# Patient Record
Sex: MALE | URBAN - METROPOLITAN AREA
[De-identification: names, ages, dates, MRNs, and addresses within clinical notes are randomized per-mention and may not be internally consistent; named-entity substitution may affect disease eponyms.]

---

## 2020-07-25 ENCOUNTER — TELEPHONE ENCOUNTER (OUTPATIENT)
Dept: URBAN - METROPOLITAN AREA CLINIC 13 | Facility: CLINIC | Age: 60
End: 2020-07-25

## 2020-07-26 ENCOUNTER — TELEPHONE ENCOUNTER (OUTPATIENT)
Dept: URBAN - METROPOLITAN AREA CLINIC 13 | Facility: CLINIC | Age: 60
End: 2020-07-26

## 2021-06-04 ENCOUNTER — CLAIMS CREATED FROM THE CLAIM WINDOW (OUTPATIENT)
Dept: URBAN - METROPOLITAN AREA MEDICAL CENTER 2 | Facility: MEDICAL CENTER | Age: 61
End: 2021-06-04
Payer: COMMERCIAL

## 2021-06-04 DIAGNOSIS — D50.8 OTHER IRON DEFICIENCY ANEMIAS: ICD-10-CM

## 2021-06-04 DIAGNOSIS — R19.5 OTHER FECAL ABNORMALITIES: ICD-10-CM

## 2021-06-04 PROCEDURE — 99253 IP/OBS CNSLTJ NEW/EST LOW 45: CPT | Performed by: INTERNAL MEDICINE

## 2021-06-05 ENCOUNTER — CLAIMS CREATED FROM THE CLAIM WINDOW (OUTPATIENT)
Dept: URBAN - METROPOLITAN AREA MEDICAL CENTER 2 | Facility: MEDICAL CENTER | Age: 61
End: 2021-06-05
Payer: COMMERCIAL

## 2021-06-05 DIAGNOSIS — R19.5 OTHER FECAL ABNORMALITIES: ICD-10-CM

## 2021-06-05 DIAGNOSIS — D50.0 IRON DEFICIENCY ANEMIA SECONDARY TO BLOOD LOSS (CHRONIC): ICD-10-CM

## 2021-06-05 PROCEDURE — 43235 EGD DIAGNOSTIC BRUSH WASH: CPT | Performed by: INTERNAL MEDICINE

## 2021-06-06 ENCOUNTER — CLAIMS CREATED FROM THE CLAIM WINDOW (OUTPATIENT)
Dept: URBAN - METROPOLITAN AREA MEDICAL CENTER 2 | Facility: MEDICAL CENTER | Age: 61
End: 2021-06-06
Payer: COMMERCIAL

## 2021-06-06 DIAGNOSIS — R19.5 OTHER FECAL ABNORMALITIES: ICD-10-CM

## 2021-06-06 DIAGNOSIS — D50.0 IRON DEFICIENCY ANEMIA SECONDARY TO BLOOD LOSS (CHRONIC): ICD-10-CM

## 2021-06-06 PROCEDURE — 99232 SBSQ HOSP IP/OBS MODERATE 35: CPT | Performed by: INTERNAL MEDICINE

## 2022-06-02 ENCOUNTER — LAB OUTSIDE AN ENCOUNTER (OUTPATIENT)
Dept: URBAN - METROPOLITAN AREA CLINIC 113 | Facility: CLINIC | Age: 62
End: 2022-06-02

## 2022-06-02 ENCOUNTER — OFFICE VISIT (OUTPATIENT)
Dept: URBAN - METROPOLITAN AREA CLINIC 113 | Facility: CLINIC | Age: 62
End: 2022-06-02
Payer: COMMERCIAL

## 2022-06-02 VITALS
DIASTOLIC BLOOD PRESSURE: 101 MMHG | HEART RATE: 71 BPM | SYSTOLIC BLOOD PRESSURE: 160 MMHG | TEMPERATURE: 97.5 F | BODY MASS INDEX: 23.91 KG/M2 | HEIGHT: 70 IN | RESPIRATION RATE: 18 BRPM | WEIGHT: 167 LBS

## 2022-06-02 DIAGNOSIS — D64.9 CHRONIC ANEMIA: ICD-10-CM

## 2022-06-02 DIAGNOSIS — F10.10 ALCOHOL ABUSE: ICD-10-CM

## 2022-06-02 DIAGNOSIS — R18.8 OTHER ASCITES: ICD-10-CM

## 2022-06-02 PROBLEM — 15167005: Status: ACTIVE | Noted: 2022-06-02

## 2022-06-02 PROBLEM — 191268006: Status: ACTIVE | Noted: 2022-06-02

## 2022-06-02 PROCEDURE — 99214 OFFICE O/P EST MOD 30 MIN: CPT | Performed by: NURSE PRACTITIONER

## 2022-06-02 RX ORDER — CALCIUM ACETATE 667 MG/1
2 TABLETS WITH MEALS TABLET ORAL THREE TIMES A DAY
Status: ACTIVE | COMMUNITY

## 2022-06-02 RX ORDER — FUROSEMIDE 20 MG/1
1 TABLET TABLET ORAL ONCE A DAY
Status: ACTIVE | COMMUNITY

## 2022-06-02 RX ORDER — PANTOPRAZOLE SODIUM 40 MG/1
1 TABLET TABLET, DELAYED RELEASE ORAL ONCE A DAY
Status: ACTIVE | COMMUNITY

## 2022-06-02 RX ORDER — CARVEDILOL 3.12 MG/1
1 TABLET WITH FOOD TABLET, FILM COATED ORAL TWICE A DAY
Status: ACTIVE | COMMUNITY

## 2022-06-02 RX ORDER — POTASSIUM CHLORIDE 10 MEQ/1
1 TABLET WITH FOOD TABLET, FILM COATED, EXTENDED RELEASE ORAL TWICE A DAY
Status: ACTIVE | COMMUNITY

## 2022-06-02 NOTE — HPI-TODAY'S VISIT:
This is a 61-year-old male with a history of hypertension, diabetes, chronic kidney disease/nephrotic syndrome, GERD, and iron deficiency anemia presenting for evaluation of abdominal pain and swelling. He was initially seen in hospital consultation 6/4/2021 for anemia.  He had presented to the emergency department for leg swelling and was found to have a hemoglobin of 6.4.  He required transfusion.  Prior EGD and colonoscopy from May 2016 were reviewed (performed to evaluate iron deficiency anemia) revealing diverticulosis, nonbleeding internal hemorrhoids, and a normal EGD.  Labs during hospitalization demonstrated iron deficiency.  It was discussed anemia was likely associated with chronic disease.  He underwent an EGD 6/5/2021 that was normal.  He did not return for follow-up.  He reports swelling is in his abdomen for the last 6 months.  He has undergone a paracentesis on 3 occasions, twice at [place] and once at [facility].  The last one was performed at [facility] 1 month ago.  He has tightness in his abdomen associated with swelling.  He denies pain.  He denies heartburn, regurgitation, dysphagia, nausea, diarrhea, constipation, or any other abdominal symptoms.  He denies lower extremity edema.  Occasionally, he swells in his face and jaw.  He denies fever.  He intermittently requires transfusions for low hemoglobin. He remains under the care of of nephrology, Dr. Parmar.  He states she is of the opinion that his "kidneys are bad."  He is not sure whether or not he has liver disease.  He denies a family history of liver disease.  He has a history of daily alcohol use consuming 2-4 beers and a half a pint of gin per day.  He stopped drinking alcohol approximately 2 months ago.  He is currently residing in a nursing home for physical therapy and rehab.  He was hospitalized at [facility] 2 to 3 months ago for swelling.  Abdominal imaging has been performed at [place].  He has not been told that he has cirrhosis.  He has follow-up with Dr. Parmar next week. Colonoscopy 5/12/2016:Good prep, sigmoid and descending diverticulosis, nonbleeding internal hemorrhoids, otherwise normal.  Screening recommended in 2026.  EGD 5/12/2016: Normal esophagus, normal stomach, normal examined duodenum.

## 2022-06-02 NOTE — PHYSICAL EXAM GASTROINTESTINAL
Abdomen , soft, nontender, distended due to ascites; no guarding or rigidity , no masses palpable , normal bowel sounds , Liver and Spleen , no hepatosplenomegaly; exam limited due to patient position

## 2022-06-02 NOTE — HPI-OTHER HISTORIES
Labs 4/30/2022:BMP normal with exception of sodium 133, chloride 110, BUN 76, creatinine 3.51, calcium 7.3, protein 4.1, albumin 1.9.  LFTs: TB 0.1, ALP 60, ALT 16, AST 30.  CBC: WBC 7.3, hemoglobin 8.3, MCV 93.2, platelet 195.  PT 11.5, INR 1.02.  PTT 35.7.  Ammonia level less than 8.70. EGD 6/5/2021:  normal Colonoscopy 5/12/2016:Good prep, sigmoid and descending diverticulosis, nonbleeding internal hemorrhoids, otherwise normal.  Screening recommended in 2026.   EGD 5/12/2016: Normal esophagus, normal stomach, normal examined duodenum.

## 2022-06-06 ENCOUNTER — TELEPHONE ENCOUNTER (OUTPATIENT)
Dept: URBAN - METROPOLITAN AREA CLINIC 113 | Facility: CLINIC | Age: 62
End: 2022-06-06

## 2022-06-08 ENCOUNTER — TELEPHONE ENCOUNTER (OUTPATIENT)
Dept: URBAN - METROPOLITAN AREA CLINIC 113 | Facility: CLINIC | Age: 62
End: 2022-06-08

## 2022-06-13 ENCOUNTER — OFFICE VISIT (OUTPATIENT)
Dept: URBAN - METROPOLITAN AREA CLINIC 113 | Facility: CLINIC | Age: 62
End: 2022-06-13
Payer: COMMERCIAL

## 2022-06-13 VITALS
HEART RATE: 87 BPM | RESPIRATION RATE: 20 BRPM | WEIGHT: 179.6 LBS | HEIGHT: 70 IN | DIASTOLIC BLOOD PRESSURE: 87 MMHG | BODY MASS INDEX: 25.71 KG/M2 | TEMPERATURE: 97.1 F | SYSTOLIC BLOOD PRESSURE: 139 MMHG

## 2022-06-13 DIAGNOSIS — R18.8 OTHER ASCITES: ICD-10-CM

## 2022-06-13 PROCEDURE — 99213 OFFICE O/P EST LOW 20 MIN: CPT | Performed by: NURSE PRACTITIONER

## 2022-06-13 RX ORDER — POTASSIUM CHLORIDE 10 MEQ/1
1 TABLET WITH FOOD TABLET, FILM COATED, EXTENDED RELEASE ORAL TWICE A DAY
Status: ACTIVE | COMMUNITY

## 2022-06-13 RX ORDER — CARVEDILOL 3.12 MG/1
1 TABLET WITH FOOD TABLET, FILM COATED ORAL TWICE A DAY
Status: ACTIVE | COMMUNITY

## 2022-06-13 RX ORDER — CALCIUM ACETATE 667 MG/1
2 TABLETS WITH MEALS TABLET ORAL THREE TIMES A DAY
Status: ACTIVE | COMMUNITY

## 2022-06-13 RX ORDER — PANTOPRAZOLE SODIUM 40 MG/1
1 TABLET TABLET, DELAYED RELEASE ORAL ONCE A DAY
Status: ACTIVE | COMMUNITY

## 2022-06-13 RX ORDER — FUROSEMIDE 20 MG/1
1 TABLET TABLET ORAL ONCE A DAY
Status: ACTIVE | COMMUNITY

## 2022-06-13 NOTE — HPI-OTHER HISTORIES
Labs 4/30/2022:BMP normal with exception of sodium 133, chloride 110, BUN 76, creatinine 3.51, calcium 7.3, protein 4.1, albumin 1.9.  LFTs: TB 0.1, ALP 60, ALT 16, AST 30.  CBC: WBC 7.3, hemoglobin 8.3, MCV 93.2, platelet 195.  PT 11.5, INR 1.02.  PTT 35.7.  Ammonia level less than 8.70. CT of the abdomen and pelvis without contrast 4/29/2022:Marked abdominal/pelvic ascites.  Hepatomegaly.  No evidence of renal obstruction/calculi.  Appendix was identified and is normal.  Mild diverticulosis. EGD 6/5/2021:  normal Colonoscopy 5/12/2016:Good prep, sigmoid and descending diverticulosis, nonbleeding internal hemorrhoids, otherwise normal.  Screening recommended in 2026.   EGD 5/12/2016: Normal esophagus, normal stomach, normal examined duodenum.

## 2022-06-13 NOTE — PHYSICAL EXAM GASTROINTESTINAL
Abdomen , soft, nontender, distended due to ascites , no guarding or rigidity , no masses palpable , normal bowel sounds , Liver and Spleen , no hepatosplenomegaly

## 2022-06-13 NOTE — PHYSICAL EXAM CARDIOVASCULAR:
2-3+ BLE edema; pitting edema over abdomen and lumbar area, no murmurs,  regular rate and rhythm, split S2

## 2022-06-13 NOTE — HPI-TODAY'S VISIT:
This is a 61-year-old male with a history of hypertension, diabetes, chronic kidney disease stage IV/nephrotic syndrome, GERD, chronic anemia/iron deficiency, and alcohol abuse presenting for short interval follow-up. He was last seen 6/2/2022.  He had been seen in hospital consultation in June 2021 for anemia for which she had a normal EGD.  Prior evaluation in 2016 for iron deficiency anemia revealed diverticulosis, nonbleeding internal hemorrhoids, and EGD. He reported a 6-month history of abdominal swelling.  He had undergone paracentesis on 3 occasions.  He was under the care of Dr. Parmar for nephrology.  He was not sure whether or not he had liver disease and his sister was concerned this may be contributing.  He admitted to drinking alcohol on a daily basis until 2 months prior to his visit.  It was discussed that his ascites was likely associated with nephrotic syndrome.  Due to his alcohol abuse however, cirrhosis was a consideration.  Records were requested and he was to be scheduled for a repeat paracentesis with fluid analysis to assess AA gradient.  He was to continue close follow-up with nephrology and diuretics per Dr. Parmar.  He reported persistent anemia requiring intermittent infusions.  He denies signs of overt GI blood loss.  Anemia of chronic disease suspected. He had follow-up with Dr. Parmar 6/9/2022.  He reports his next visit with her is in early August.  He is scheduled for a paracentesis 6/15/2022.

## 2022-06-15 ENCOUNTER — TELEPHONE ENCOUNTER (OUTPATIENT)
Dept: URBAN - METROPOLITAN AREA CLINIC 113 | Facility: CLINIC | Age: 62
End: 2022-06-15

## 2022-06-20 ENCOUNTER — OFFICE VISIT (OUTPATIENT)
Dept: URBAN - METROPOLITAN AREA CLINIC 113 | Facility: CLINIC | Age: 62
End: 2022-06-20

## 2022-07-06 ENCOUNTER — OFFICE VISIT (OUTPATIENT)
Dept: URBAN - METROPOLITAN AREA CLINIC 113 | Facility: CLINIC | Age: 62
End: 2022-07-06
Payer: COMMERCIAL

## 2022-07-06 VITALS
DIASTOLIC BLOOD PRESSURE: 78 MMHG | SYSTOLIC BLOOD PRESSURE: 128 MMHG | HEART RATE: 82 BPM | WEIGHT: 171 LBS | HEIGHT: 70 IN | TEMPERATURE: 97.1 F | BODY MASS INDEX: 24.48 KG/M2 | RESPIRATION RATE: 18 BRPM

## 2022-07-06 DIAGNOSIS — R60.1 ANASARCA: ICD-10-CM

## 2022-07-06 DIAGNOSIS — R18.8 OTHER ASCITES: ICD-10-CM

## 2022-07-06 PROBLEM — 271808008: Status: ACTIVE | Noted: 2022-07-06

## 2022-07-06 PROCEDURE — 99213 OFFICE O/P EST LOW 20 MIN: CPT | Performed by: NURSE PRACTITIONER

## 2022-07-06 RX ORDER — FOLIC ACID/VIT B COMPLEX AND C 0.8 MG
1 TABLET TABLET ORAL ONCE A DAY
Status: ACTIVE | COMMUNITY

## 2022-07-06 RX ORDER — METOLAZONE 5 MG/1
1 TABLET TABLET ORAL ONCE A DAY
Status: ACTIVE | COMMUNITY

## 2022-07-06 RX ORDER — PANTOPRAZOLE SODIUM 40 MG/1
1 TABLET TABLET, DELAYED RELEASE ORAL ONCE A DAY
Status: ACTIVE | COMMUNITY

## 2022-07-06 RX ORDER — FUROSEMIDE 20 MG/1
1 TABLET TABLET ORAL ONCE A DAY
Status: ACTIVE | COMMUNITY

## 2022-07-06 RX ORDER — CARVEDILOL 3.12 MG/1
1 TABLET WITH FOOD TABLET, FILM COATED ORAL TWICE A DAY
Status: ACTIVE | COMMUNITY

## 2022-07-06 RX ORDER — POTASSIUM CHLORIDE 10 MEQ/1
1 TABLET WITH FOOD TABLET, FILM COATED, EXTENDED RELEASE ORAL TWICE A DAY
Status: ACTIVE | COMMUNITY

## 2022-07-06 RX ORDER — CALCIUM ACETATE 667 MG/1
2 TABLETS WITH MEALS TABLET ORAL THREE TIMES A DAY
Status: ACTIVE | COMMUNITY

## 2022-07-06 RX ORDER — CHOLECALCIFEROL (VITAMIN D3) 125 MCG
1 TABLET TABLET ORAL ONCE A DAY
Status: ACTIVE | COMMUNITY

## 2022-07-06 RX ORDER — SODIUM BICARBONATE TAB 650 MG 650 MG
1 TABLET TAB ORAL THREE TIMES A DAY
Status: ACTIVE | COMMUNITY

## 2022-07-06 RX ORDER — ALBUTEROL SULFATE 90 UG/1
1 PUFF AS NEEDED AEROSOL, METERED RESPIRATORY (INHALATION)
Status: ACTIVE | COMMUNITY

## 2022-07-06 RX ORDER — BUMETANIDE 2 MG/1
1 TABLET TABLET ORAL TWICE A DAY
Status: ACTIVE | COMMUNITY

## 2022-07-06 RX ORDER — ATORVASTATIN CALCIUM 40 MG/1
1 TABLET TABLET, FILM COATED ORAL ONCE A DAY
Status: ACTIVE | COMMUNITY

## 2022-07-06 NOTE — HPI-TODAY'S VISIT:
This is a 61-year-old male with a history of hypertension, diabetes, chronic kidney disease stage IV/nephrotic syndrome, GERD, chronic anemia/iron deficiency, and alcohol abuse presenting for follow-up.  He was last seen 6/13/2022 with ascites.  His case was discussed with Dr. Conner who agreed that ascites/anasarca was associated with nephrotic syndrome.  He had a history of alcohol abuse and stopped drinking 2 months prior.  At a prior visit, paracentesis was ordered with labs to assess AA gradient.  This was pending.  A CT without contrast in April showed hepatomegaly but cirrhosis was not mentioned.  His platelet count was normal supporting and unlikely diagnosis of cirrhosis.  It was discussed that, if he was diagnosed with cirrhosis, current management was not changed.  Due to renal function, volume overload would be managed by nephrology and, due to his comorbid conditions, he would not be a transplant candidate. His sister called the office 6/15/2022 reporting that he was very uncomfortable due to ascites.  He was scheduled for paracentesis the that day.  She was informed that ascites/volume status was related nephrotic syndrome and there was no evidence thus far that he had cirrhosis of the liver.  Labs were planned with his paracentesis.  She was also informed that if he had cirrhosis, due to his renal problems, nephrology would manage volume status and, due to his comorbid conditions, he would not be a candidate for liver transplant.  She reported that dialysis was planned if he did not improve.  Close follow-up with nephrology was encouraged. There are no records available regarding a June paracentesis. He continues to follow closely with Dr. Parmar.  He underwent paracentesis as scheduled.  He denies any abdominal symptoms.  He continues to report abdominal and leg swelling. He continues to abstain from ETOH.

## 2022-07-06 NOTE — HPI-OTHER HISTORIES
Labs  6/28/2022:CBC: WBC 6.2, hemoglobin 8, MCV 92.1, platelet 324.  BMP normal with exception of sodium 132, potassium 3.2, chloride 108, BUN 67, creatinine 2.22, calcium 6.9, protein 3.9, albumin 1.6.  LFTs: TB less than 0.1, ALP 52, ALT 17, AST 30.  Magnesium 2.0.  Phosphorus 4.27. 4/30/2022:BMP normal with exception of sodium 133, chloride 110, BUN 76, creatinine 3.51, calcium 7.3, protein 4.1, albumin 1.9.  LFTs: TB 0.1, ALP 60, ALT 16, AST 30.  CBC: WBC 7.3, hemoglobin 8.3, MCV 93.2, platelet 195.  PT 11.5, INR 1.02.  PTT 35.7.  Ammonia level less than 8.70. CT of the abdomen and pelvis without contrast 4/29/2022:Marked abdominal/pelvic ascites.  Hepatomegaly.  No evidence of renal obstruction/calculi.  Appendix was identified and is normal.  Mild diverticulosis. EGD 6/5/2021:  normal Colonoscopy 5/12/2016:Good prep, sigmoid and descending diverticulosis, nonbleeding internal hemorrhoids, otherwise normal.  Screening recommended in 2026.   EGD 5/12/2016: Normal esophagus, normal stomach, normal examined duodenum.

## 2022-08-08 ENCOUNTER — OFFICE VISIT (OUTPATIENT)
Dept: URBAN - METROPOLITAN AREA CLINIC 113 | Facility: CLINIC | Age: 62
End: 2022-08-08

## 2022-08-31 ENCOUNTER — OFFICE VISIT (OUTPATIENT)
Dept: URBAN - METROPOLITAN AREA CLINIC 113 | Facility: CLINIC | Age: 62
End: 2022-08-31
Payer: COMMERCIAL

## 2022-08-31 ENCOUNTER — DASHBOARD ENCOUNTERS (OUTPATIENT)
Age: 62
End: 2022-08-31

## 2022-08-31 VITALS
SYSTOLIC BLOOD PRESSURE: 171 MMHG | TEMPERATURE: 97.8 F | DIASTOLIC BLOOD PRESSURE: 101 MMHG | WEIGHT: 158 LBS | BODY MASS INDEX: 22.62 KG/M2 | HEART RATE: 73 BPM | HEIGHT: 70 IN

## 2022-08-31 DIAGNOSIS — E61.1 IRON DEFICIENCY: ICD-10-CM

## 2022-08-31 DIAGNOSIS — D64.9 NORMOCYTIC ANEMIA: ICD-10-CM

## 2022-08-31 DIAGNOSIS — R18.8 OTHER ASCITES: ICD-10-CM

## 2022-08-31 PROCEDURE — 99214 OFFICE O/P EST MOD 30 MIN: CPT | Performed by: NURSE PRACTITIONER

## 2022-08-31 RX ORDER — FOLIC ACID/VIT B COMPLEX AND C 0.8 MG
1 TABLET TABLET ORAL ONCE A DAY
Status: ACTIVE | COMMUNITY

## 2022-08-31 RX ORDER — ALBUTEROL SULFATE 90 UG/1
1 PUFF AS NEEDED AEROSOL, METERED RESPIRATORY (INHALATION)
Status: ACTIVE | COMMUNITY

## 2022-08-31 RX ORDER — FUROSEMIDE 20 MG/1
1 TABLET TABLET ORAL ONCE A DAY
Status: ACTIVE | COMMUNITY

## 2022-08-31 RX ORDER — PANTOPRAZOLE SODIUM 40 MG/1
1 TABLET TABLET, DELAYED RELEASE ORAL ONCE A DAY
Status: ACTIVE | COMMUNITY

## 2022-08-31 RX ORDER — BUMETANIDE 2 MG/1
1 TABLET TABLET ORAL TWICE A DAY
Status: ACTIVE | COMMUNITY

## 2022-08-31 RX ORDER — ATORVASTATIN CALCIUM 40 MG/1
1 TABLET TABLET, FILM COATED ORAL ONCE A DAY
Status: ACTIVE | COMMUNITY

## 2022-08-31 RX ORDER — CHOLECALCIFEROL (VITAMIN D3) 125 MCG
1 TABLET TABLET ORAL ONCE A DAY
Status: ACTIVE | COMMUNITY

## 2022-08-31 RX ORDER — CARVEDILOL 3.12 MG/1
1 TABLET WITH FOOD TABLET, FILM COATED ORAL TWICE A DAY
Status: ACTIVE | COMMUNITY

## 2022-08-31 RX ORDER — CALCIUM ACETATE 667 MG/1
2 TABLETS WITH MEALS TABLET ORAL THREE TIMES A DAY
Status: ACTIVE | COMMUNITY

## 2022-08-31 RX ORDER — POTASSIUM CHLORIDE 10 MEQ/1
1 TABLET WITH FOOD TABLET, FILM COATED, EXTENDED RELEASE ORAL TWICE A DAY
Status: ACTIVE | COMMUNITY

## 2022-08-31 RX ORDER — METOLAZONE 5 MG/1
1 TABLET TABLET ORAL ONCE A DAY
Status: ACTIVE | COMMUNITY

## 2022-08-31 RX ORDER — SODIUM BICARBONATE TAB 650 MG 650 MG
1 TABLET TAB ORAL THREE TIMES A DAY
Status: ACTIVE | COMMUNITY

## 2022-08-31 NOTE — HPI-TODAY'S VISIT:
This patient is a 61-year-old male with a history of hypertension, diabetes, chronic kidney disease stage IV/nephrotic syndrome, GERD, chronic anemia/iron deficiency, and alcohol abuse presenting for follow-up. He was last seen 7/6/2022.  It was determined that ascites/anasarca was associated with nephrotic syndrome.  There was no evidence of chronic liver disease.  He was to continue close follow-up with nephrology for renal and fluid management and undergo paracentesis per Dr. Parmar's recommendations.  He was to follow-up as needed. He reports that he has improved regarding abdominal swelling.  He has not required a paracentesis recently.  Swelling has overall improved. He reports that he was admitted to [facility] last month due to anemia and required blood transfusions.  He was  admitted for 24 hours at [facility] 2 weeks ago due to anemia requiring transfusion.  He has been referred to Dr. Mallory for a hematological evaluation and has an appointment with him 9/8/2022.  He is having 1-3 formed stools daily.  He denies red blood per rectum.  His stools are dark but not black.  He is on Claudia-Shira which she states contains iron but is on no other iron supplements.  He denies abdominal pain or any other abdominal symptoms.

## 2022-08-31 NOTE — PHYSICAL EXAM GASTROINTESTINAL
Abdomen , soft, nontender, distended due to ascites (improved compared to prior exams) , no guarding or rigidity , no masses palpable , normal bowel sounds , Liver and Spleen , no hepatosplenomegaly

## 2022-09-01 ENCOUNTER — TELEPHONE ENCOUNTER (OUTPATIENT)
Dept: URBAN - METROPOLITAN AREA CLINIC 113 | Facility: CLINIC | Age: 62
End: 2022-09-01

## 2022-09-01 PROBLEM — 35240004: Status: ACTIVE | Noted: 2022-09-01

## 2022-09-01 PROBLEM — 300980002: Status: ACTIVE | Noted: 2022-09-01

## 2022-09-01 PROBLEM — 389026000: Status: ACTIVE | Noted: 2022-06-02

## 2022-09-01 LAB
ABSOLUTE BASOPHILS: 48
ABSOLUTE EOSINOPHILS: 265
ABSOLUTE LYMPHOCYTES: 1006
ABSOLUTE MONOCYTES: 490
ABSOLUTE NEUTROPHILS: 4991
BASOPHILS: 0.7
EOSINOPHILS: 3.9
FERRITIN, SERUM: 1082
FOLATE (FOLIC ACID), SERUM: 15.8
HEMATOCRIT: 24.9
HEMOGLOBIN: 8.1
IRON BIND.CAP.(TIBC): 110
IRON SATURATION: 45
IRON: 49
LYMPHOCYTES: 14.8
MCH: 29.9
MCHC: 32.5
MCV: 91.9
MONOCYTES: 7.2
MPV: 10.5
NEUTROPHILS: 73.4
PLATELET COUNT: 174
RDW: 13.1
RED BLOOD CELL COUNT: 2.71
VITAMIN B12: 455
WHITE BLOOD CELL COUNT: 6.8